# Patient Record
Sex: MALE | Race: WHITE | NOT HISPANIC OR LATINO | URBAN - METROPOLITAN AREA
[De-identification: names, ages, dates, MRNs, and addresses within clinical notes are randomized per-mention and may not be internally consistent; named-entity substitution may affect disease eponyms.]

---

## 2017-10-28 ENCOUNTER — INPATIENT (INPATIENT)
Facility: HOSPITAL | Age: 52
LOS: 2 days | Discharge: HOME | DRG: 390 | End: 2017-10-31
Attending: SURGERY | Admitting: SURGERY
Payer: COMMERCIAL

## 2017-10-28 VITALS
TEMPERATURE: 99 F | RESPIRATION RATE: 18 BRPM | HEART RATE: 97 BPM | OXYGEN SATURATION: 100 % | SYSTOLIC BLOOD PRESSURE: 138 MMHG | DIASTOLIC BLOOD PRESSURE: 92 MMHG

## 2017-10-28 LAB
ALBUMIN SERPL ELPH-MCNC: 4.6 G/DL — SIGNIFICANT CHANGE UP (ref 3.3–5)
ALP SERPL-CCNC: 53 U/L — SIGNIFICANT CHANGE UP (ref 40–120)
ALT FLD-CCNC: 40 U/L — SIGNIFICANT CHANGE UP (ref 10–45)
ANION GAP SERPL CALC-SCNC: 15 MMOL/L — SIGNIFICANT CHANGE UP (ref 5–17)
APTT BLD: 28.8 SEC — SIGNIFICANT CHANGE UP (ref 27.5–37.4)
AST SERPL-CCNC: 25 U/L — SIGNIFICANT CHANGE UP (ref 10–40)
BASOPHILS NFR BLD AUTO: 0.4 % — SIGNIFICANT CHANGE UP (ref 0–2)
BILIRUB SERPL-MCNC: 0.6 MG/DL — SIGNIFICANT CHANGE UP (ref 0.2–1.2)
BUN SERPL-MCNC: 18 MG/DL — SIGNIFICANT CHANGE UP (ref 7–23)
CALCIUM SERPL-MCNC: 8.6 MG/DL — SIGNIFICANT CHANGE UP (ref 8.4–10.5)
CHLORIDE SERPL-SCNC: 95 MMOL/L — LOW (ref 96–108)
CO2 SERPL-SCNC: 28 MMOL/L — SIGNIFICANT CHANGE UP (ref 22–31)
CREAT SERPL-MCNC: 0.94 MG/DL — SIGNIFICANT CHANGE UP (ref 0.5–1.3)
EOSINOPHIL NFR BLD AUTO: 0.6 % — SIGNIFICANT CHANGE UP (ref 0–6)
GLUCOSE SERPL-MCNC: 124 MG/DL — HIGH (ref 70–99)
HCT VFR BLD CALC: 45.9 % — SIGNIFICANT CHANGE UP (ref 39–50)
HGB BLD-MCNC: 15.4 G/DL — SIGNIFICANT CHANGE UP (ref 13–17)
INR BLD: 0.98 — SIGNIFICANT CHANGE UP (ref 0.88–1.16)
LIDOCAIN IGE QN: 28 U/L — SIGNIFICANT CHANGE UP (ref 7–60)
LYMPHOCYTES # BLD AUTO: 16.7 % — SIGNIFICANT CHANGE UP (ref 13–44)
MCHC RBC-ENTMCNC: 30.6 PG — SIGNIFICANT CHANGE UP (ref 27–34)
MCHC RBC-ENTMCNC: 33.6 G/DL — SIGNIFICANT CHANGE UP (ref 32–36)
MCV RBC AUTO: 91.3 FL — SIGNIFICANT CHANGE UP (ref 80–100)
MONOCYTES NFR BLD AUTO: 14.7 % — HIGH (ref 2–14)
NEUTROPHILS NFR BLD AUTO: 67.6 % — SIGNIFICANT CHANGE UP (ref 43–77)
PLATELET # BLD AUTO: 231 K/UL — SIGNIFICANT CHANGE UP (ref 150–400)
POTASSIUM SERPL-MCNC: 3.4 MMOL/L — LOW (ref 3.5–5.3)
POTASSIUM SERPL-SCNC: 3.4 MMOL/L — LOW (ref 3.5–5.3)
PROT SERPL-MCNC: 7.8 G/DL — SIGNIFICANT CHANGE UP (ref 6–8.3)
PROTHROM AB SERPL-ACNC: 10.9 SEC — SIGNIFICANT CHANGE UP (ref 9.8–12.7)
RBC # BLD: 5.03 M/UL — SIGNIFICANT CHANGE UP (ref 4.2–5.8)
RBC # FLD: 13.3 % — SIGNIFICANT CHANGE UP (ref 10.3–16.9)
SODIUM SERPL-SCNC: 138 MMOL/L — SIGNIFICANT CHANGE UP (ref 135–145)
WBC # BLD: 7.9 K/UL — SIGNIFICANT CHANGE UP (ref 3.8–10.5)
WBC # FLD AUTO: 7.9 K/UL — SIGNIFICANT CHANGE UP (ref 3.8–10.5)

## 2017-10-28 PROCEDURE — 99285 EMERGENCY DEPT VISIT HI MDM: CPT

## 2017-10-28 RX ORDER — FAMOTIDINE 10 MG/ML
20 INJECTION INTRAVENOUS ONCE
Qty: 0 | Refills: 0 | Status: COMPLETED | OUTPATIENT
Start: 2017-10-28 | End: 2017-10-28

## 2017-10-28 RX ORDER — SODIUM CHLORIDE 9 MG/ML
1000 INJECTION INTRAMUSCULAR; INTRAVENOUS; SUBCUTANEOUS ONCE
Qty: 0 | Refills: 0 | Status: COMPLETED | OUTPATIENT
Start: 2017-10-28 | End: 2017-10-28

## 2017-10-28 RX ORDER — SODIUM CHLORIDE 9 MG/ML
1000 INJECTION INTRAMUSCULAR; INTRAVENOUS; SUBCUTANEOUS
Qty: 0 | Refills: 0 | Status: DISCONTINUED | OUTPATIENT
Start: 2017-10-28 | End: 2017-10-29

## 2017-10-28 RX ORDER — IOHEXOL 300 MG/ML
50 INJECTION, SOLUTION INTRAVENOUS ONCE
Qty: 0 | Refills: 0 | Status: COMPLETED | OUTPATIENT
Start: 2017-10-28 | End: 2017-10-28

## 2017-10-28 RX ORDER — METOCLOPRAMIDE HCL 10 MG
10 TABLET ORAL ONCE
Qty: 0 | Refills: 0 | Status: COMPLETED | OUTPATIENT
Start: 2017-10-28 | End: 2017-10-28

## 2017-10-28 RX ORDER — SODIUM CHLORIDE 9 MG/ML
3 INJECTION INTRAMUSCULAR; INTRAVENOUS; SUBCUTANEOUS ONCE
Qty: 0 | Refills: 0 | Status: COMPLETED | OUTPATIENT
Start: 2017-10-28 | End: 2017-10-28

## 2017-10-28 RX ADMIN — IOHEXOL 50 MILLILITER(S): 300 INJECTION, SOLUTION INTRAVENOUS at 22:47

## 2017-10-28 RX ADMIN — Medication 10 MILLIGRAM(S): at 22:14

## 2017-10-28 RX ADMIN — SODIUM CHLORIDE 3 MILLILITER(S): 9 INJECTION INTRAMUSCULAR; INTRAVENOUS; SUBCUTANEOUS at 22:11

## 2017-10-28 RX ADMIN — SODIUM CHLORIDE 1000 MILLILITER(S): 9 INJECTION INTRAMUSCULAR; INTRAVENOUS; SUBCUTANEOUS at 22:14

## 2017-10-28 RX ADMIN — SODIUM CHLORIDE 1000 MILLILITER(S): 9 INJECTION INTRAMUSCULAR; INTRAVENOUS; SUBCUTANEOUS at 22:58

## 2017-10-28 RX ADMIN — FAMOTIDINE 20 MILLIGRAM(S): 10 INJECTION INTRAVENOUS at 22:14

## 2017-10-28 NOTE — ED ADULT NURSE NOTE - OBJECTIVE STATEMENT
Pt presents with C/O PERSISTENT VOMITING over 3 days after eating dinner in restaurant. Denied Diarrhea, Pn, but stated that he is unable to keep anything down, including liquids.

## 2017-10-28 NOTE — ED PROVIDER NOTE - OBJECTIVE STATEMENT
The pt is a 53 y/o M, who presents to ED c/o n/v and lower abd pain x 2 d s/p eating at a restaurant (states that others who ate same things are sick w/similar symptoms). Pt c/o feeling "very dehydrated", n/v after any po intake, lower abd cramping after po intake, passing gas, normal BM today.  Took meclizine and ppi w/o any improvement of symptoms. No PSH. Denies fevers, chills, cp, sob, dysuria, diarrhea, constipation, anorexia

## 2017-10-28 NOTE — ED PROVIDER NOTE - ATTENDING CONTRIBUTION TO CARE
51 yo male h/o gerd c/o abd pain, distension, intractable n/v since eating out w friends 3 d ago.  Friends ill w same but now better.  Pt denies prior abd surg.  Decreased bm but had small bm today, + flatus.  No blood in emesis.  Pain feels like gas.  No fever, no urinary sx.  No relief w compazine suppository 6 h pta.  Well appearing, nad, nc/at, mm slightly dry, lung cta, heart reg, abd soft, mild ttp llq, + distension, no r/g, no cvat, ext no c/c/e, no gross neuro deficits.  ? viral or food borne, no sbo risks so doubt sbo, ? diverticulitis.  Plan labs, ivf, antiemetics, ct abd, reassess.

## 2017-10-28 NOTE — ED PROVIDER NOTE - GASTROINTESTINAL, MLM
Abdomen soft, + LLQ tend, no rebound, no guarding, no CVA tend b/l Abdomen softly distended, + LLQ > RLQ tend, no rebound, no guarding, no CVA tend b/l

## 2017-10-28 NOTE — ED ADULT NURSE NOTE - CHPI ED SYMPTOMS NEG
no abdominal distension/no burning urination/no hematuria/no diarrhea/no blood in stool/no dysuria/no fever

## 2017-10-28 NOTE — ED PROVIDER NOTE - PROGRESS NOTE DETAILS
surg consulted Pt w sbo on ct; surg consulted.  Pt signed out to Dr Torres; BRAIN Nye will cont to follow.

## 2017-10-28 NOTE — ED ADULT TRIAGE NOTE - CHIEF COMPLAINT QUOTE
pt went out to a restaurant 3 days ago , since then pt has not been bale to hold down anything by mouth , pt c/o diffuse abdominal pain , no diarrhea

## 2017-10-28 NOTE — ED PROVIDER NOTE - MEDICAL DECISION MAKING DETAILS
pt w/n/v and lower abd pain s/p eating out at restaurant - states that others sick w/same symptoms but his pain is not improved, no diarrhea or constipation  and passing gas, pt tender on exam hence scanned -- found to have sbo, surg consulted and will admit to tele

## 2017-10-29 LAB
APPEARANCE UR: (no result)
APTT BLD: 29.5 SEC — SIGNIFICANT CHANGE UP (ref 27.5–37.4)
BILIRUB UR-MCNC: NEGATIVE — SIGNIFICANT CHANGE UP
BLD GP AB SCN SERPL QL: NEGATIVE — SIGNIFICANT CHANGE UP
COLOR SPEC: YELLOW — SIGNIFICANT CHANGE UP
DIFF PNL FLD: NEGATIVE — SIGNIFICANT CHANGE UP
GLUCOSE UR QL: NEGATIVE — SIGNIFICANT CHANGE UP
INR BLD: 1 — SIGNIFICANT CHANGE UP (ref 0.88–1.16)
KETONES UR-MCNC: NEGATIVE — SIGNIFICANT CHANGE UP
LACTATE SERPL-SCNC: 0.9 MMOL/L — SIGNIFICANT CHANGE UP (ref 0.5–2)
LEUKOCYTE ESTERASE UR-ACNC: NEGATIVE — SIGNIFICANT CHANGE UP
NITRITE UR-MCNC: NEGATIVE — SIGNIFICANT CHANGE UP
PH UR: 8 — SIGNIFICANT CHANGE UP (ref 5–8)
PROT UR-MCNC: NEGATIVE MG/DL — SIGNIFICANT CHANGE UP
PROTHROM AB SERPL-ACNC: 11.1 SEC — SIGNIFICANT CHANGE UP (ref 9.8–12.7)
RH IG SCN BLD-IMP: POSITIVE — SIGNIFICANT CHANGE UP
SP GR SPEC: <=1.005 — SIGNIFICANT CHANGE UP (ref 1–1.03)
UROBILINOGEN FLD QL: 0.2 E.U./DL — SIGNIFICANT CHANGE UP

## 2017-10-29 PROCEDURE — 74000: CPT | Mod: 26,76

## 2017-10-29 PROCEDURE — 99222 1ST HOSP IP/OBS MODERATE 55: CPT | Mod: GC

## 2017-10-29 PROCEDURE — 71010: CPT | Mod: 26

## 2017-10-29 PROCEDURE — 74177 CT ABD & PELVIS W/CONTRAST: CPT | Mod: 26

## 2017-10-29 RX ORDER — SODIUM CHLORIDE 9 MG/ML
1000 INJECTION, SOLUTION INTRAVENOUS
Qty: 0 | Refills: 0 | Status: DISCONTINUED | OUTPATIENT
Start: 2017-10-29 | End: 2017-10-31

## 2017-10-29 RX ORDER — PANTOPRAZOLE SODIUM 20 MG/1
40 TABLET, DELAYED RELEASE ORAL DAILY
Qty: 0 | Refills: 0 | Status: DISCONTINUED | OUTPATIENT
Start: 2017-10-29 | End: 2017-10-31

## 2017-10-29 RX ORDER — HEPARIN SODIUM 5000 [USP'U]/ML
5000 INJECTION INTRAVENOUS; SUBCUTANEOUS EVERY 8 HOURS
Qty: 0 | Refills: 0 | Status: DISCONTINUED | OUTPATIENT
Start: 2017-10-29 | End: 2017-10-31

## 2017-10-29 RX ORDER — HYDROMORPHONE HYDROCHLORIDE 2 MG/ML
0.5 INJECTION INTRAMUSCULAR; INTRAVENOUS; SUBCUTANEOUS EVERY 4 HOURS
Qty: 0 | Refills: 0 | Status: DISCONTINUED | OUTPATIENT
Start: 2017-10-29 | End: 2017-10-30

## 2017-10-29 RX ORDER — ONDANSETRON 8 MG/1
4 TABLET, FILM COATED ORAL EVERY 6 HOURS
Qty: 0 | Refills: 0 | Status: DISCONTINUED | OUTPATIENT
Start: 2017-10-29 | End: 2017-10-31

## 2017-10-29 RX ORDER — OMEPRAZOLE 10 MG/1
1 CAPSULE, DELAYED RELEASE ORAL
Qty: 0 | Refills: 0 | COMMUNITY

## 2017-10-29 RX ADMIN — SODIUM CHLORIDE 125 MILLILITER(S): 9 INJECTION, SOLUTION INTRAVENOUS at 14:31

## 2017-10-29 RX ADMIN — SODIUM CHLORIDE 200 MILLILITER(S): 9 INJECTION INTRAMUSCULAR; INTRAVENOUS; SUBCUTANEOUS at 02:42

## 2017-10-29 RX ADMIN — PANTOPRAZOLE SODIUM 40 MILLIGRAM(S): 20 TABLET, DELAYED RELEASE ORAL at 05:47

## 2017-10-29 RX ADMIN — HEPARIN SODIUM 5000 UNIT(S): 5000 INJECTION INTRAVENOUS; SUBCUTANEOUS at 05:47

## 2017-10-29 RX ADMIN — SODIUM CHLORIDE 125 MILLILITER(S): 9 INJECTION, SOLUTION INTRAVENOUS at 05:20

## 2017-10-29 RX ADMIN — HEPARIN SODIUM 5000 UNIT(S): 5000 INJECTION INTRAVENOUS; SUBCUTANEOUS at 13:12

## 2017-10-29 RX ADMIN — HEPARIN SODIUM 5000 UNIT(S): 5000 INJECTION INTRAVENOUS; SUBCUTANEOUS at 22:08

## 2017-10-29 NOTE — H&P ADULT - HISTORY OF PRESENT ILLNESS
51 yo M hx GERD on prilosec presents with 2 days of N/V. Patient noted N/V associated with abdominal distension and crampiness but not pain. He has never had abdominal surgery. He has never had a colonoscopy. No FHx or PMHx of CRC or IBD. 53 yo M hx GERD on prilosec presents with 2 days of N/V. Patient noted N/V associated with abdominal distension and crampiness but not pain. Still passing gas and having some diarrhea. He has never had abdominal surgery. He has never had a colonoscopy. No FHx or PMHx of CRC or IBD.

## 2017-10-29 NOTE — H&P ADULT - NSHPPHYSICALEXAM_GEN_ALL_CORE
Alert and conversant in NAD  Breathing nonlabored  Abdomen distended, nontender, nondistended, no rebound/guarding  Extremities, warm, well perfused

## 2017-10-29 NOTE — H&P ADULT - ASSESSMENT
53 yo M with SBO. NGT placed in ED with minimal return. Patient made NPO.   -NPO/IVF  -Pain/nausea control  -NGT LIWS  -SCD/SQH  -OOBA/IS    Seen and examined with chief on call. 51 yo M with SBO. NGT placed in ED with minimal return. Patient made NPO.   -NPO/IVF  -Pain/nausea control  -NGT LIWS  -SCD/SQH  -OOBA/IS  -Serial abdominal exams    Seen and examined with chief on call.

## 2017-10-29 NOTE — H&P ADULT - ATTENDING COMMENTS
Patient seen and examined at bedside.  Feeling better, although complaining of irritation from NGT in throat/nose.  +flatus.  Denied abdominal pain.  Abdomen soft, mildly distended/tympanitic.  NT.  Labs, CT reviewed.  AXR with contrast in colon, although persistent dilated loops of small bowel.  Impression: 52M with SBO versus gastroenteritis.  Plan: NGT removed at bedside as minimal output  -NPO for 4 hours, then may start clear liquids if no nausea  -D/C telemetry  -DVT prophylaxis

## 2017-10-29 NOTE — H&P ADULT - NSHPLABSRESULTS_GEN_ALL_CORE
15.4   7.9   )-----------( 231      ( 28 Oct 2017 22:21 )             45.9   28 Oct 2017 22:21    138    |  95     |  18     ----------------------------<  124    3.4     |  28     |  0.94     Ca    8.6        28 Oct 2017 22:21    TPro  7.8    /  Alb  4.6    /  TBili  0.6    /  DBili  x      /  AST  25     /  ALT  40     /  AlkPhos  53     28 Oct 2017 22:21  PT/INR - ( 28 Oct 2017 22:21 )   PT: 10.9 sec;   INR: 0.98          PTT - ( 28 Oct 2017 22:21 )  PTT:28.8 sec    < from: CT Abdomen and Pelvis w/ Oral Cont and w/ IV Cont (10.29.17 @ 00:26) >      EXAM:  CT ABDOMEN AND PELVIS OC IC                            INTERPRETATION:  CT of the ABDOMEN and PELVIS with intravenous contrast   dated 10/29/2017 12:26 AM    FINDINGS:    Lower chest: Linear atelectasis bilateral lower lobes.    Liver: Smooth in contour. Multiple small hepatic cysts measuring up to   2.4 cm in the left lobe. Several additional subcentimeter hypodensities   in the liver which are too small to characterize. Relatively hypodense   lesion in the region of the caudate lobe measuring 2.9 cm and associated   with peripheral nodular enhancement, compatible with a hemangioma. Portal   and hepatic veins are patent.    Biliary system: Gallbladder is normal in size. No calcified gallstones.   No biliary ductal dilatation.      Adrenal glands: Unremarkable.    Kidneys: Symmetric parenchymal enhancement. 1.5 cm cyst midportion right   kidney. Subcentimeter low-density lesion midportion left kidney, too   small to characterize. Extrarenal pelvis on the left. No hydronephrosis.   No renal or ureteral stone.   Urinary Bladder: Unremarkable.    Reproductive organs: Mildly enlarged prostate..     Bowel/Peritoneum: Dilated loops of small bowel are noted, measuring up to   4.5 cm. Possible transition point noted in the mid abdomen right of   midline.. Colonic diverticuli without evidence of diverticulitis. No   extraluminal gas. Small hiatal hernia. Small amount of ascites seen in   the left lower quadrant. Mild mesenteric edema. Normal appendix.    Lymph nodes: No lymphadenopathy.      Abdominal wall: Small fat-containing umbilical hernia. Small   fat-containing left inguinal hernia.      IMPRESSION:     Small bowel obstruction, with possible transition point in the mid   abdomen right of midline. Mild mesenteric edema and ascites.    Multiple hepatic cysts and probable hemangioma, as above.      Findings discussed with Dr. Nye  on 10/29/2017 2:05 AM with read   back verification.              "Thank you for the opportunity to participate in the care of this   patient."    COCA FRANCISCO J CASALDUC M.D., RADIOLOGY RESIDENT  This document has been electronically signed.  DA ROSARIO M.D., ATTENDING RADIOLOGIST  This documenthas been electronically signed. Oct 29 2017  2:25AM                  < end of copied text >

## 2017-10-30 LAB
ANION GAP SERPL CALC-SCNC: 14 MMOL/L — SIGNIFICANT CHANGE UP (ref 5–17)
BUN SERPL-MCNC: 7 MG/DL — SIGNIFICANT CHANGE UP (ref 7–23)
CALCIUM SERPL-MCNC: 8.5 MG/DL — SIGNIFICANT CHANGE UP (ref 8.4–10.5)
CHLORIDE SERPL-SCNC: 100 MMOL/L — SIGNIFICANT CHANGE UP (ref 96–108)
CO2 SERPL-SCNC: 25 MMOL/L — SIGNIFICANT CHANGE UP (ref 22–31)
CREAT SERPL-MCNC: 0.79 MG/DL — SIGNIFICANT CHANGE UP (ref 0.5–1.3)
GLUCOSE SERPL-MCNC: 83 MG/DL — SIGNIFICANT CHANGE UP (ref 70–99)
HCT VFR BLD CALC: 41.1 % — SIGNIFICANT CHANGE UP (ref 39–50)
HGB BLD-MCNC: 13.7 G/DL — SIGNIFICANT CHANGE UP (ref 13–17)
MAGNESIUM SERPL-MCNC: 2.5 MG/DL — SIGNIFICANT CHANGE UP (ref 1.6–2.6)
MCHC RBC-ENTMCNC: 30.9 PG — SIGNIFICANT CHANGE UP (ref 27–34)
MCHC RBC-ENTMCNC: 33.3 G/DL — SIGNIFICANT CHANGE UP (ref 32–36)
MCV RBC AUTO: 92.8 FL — SIGNIFICANT CHANGE UP (ref 80–100)
PHOSPHATE SERPL-MCNC: 2.1 MG/DL — LOW (ref 2.5–4.5)
PLATELET # BLD AUTO: 211 K/UL — SIGNIFICANT CHANGE UP (ref 150–400)
POTASSIUM SERPL-MCNC: 3.7 MMOL/L — SIGNIFICANT CHANGE UP (ref 3.5–5.3)
POTASSIUM SERPL-SCNC: 3.7 MMOL/L — SIGNIFICANT CHANGE UP (ref 3.5–5.3)
RBC # BLD: 4.43 M/UL — SIGNIFICANT CHANGE UP (ref 4.2–5.8)
RBC # FLD: 13.1 % — SIGNIFICANT CHANGE UP (ref 10.3–16.9)
SODIUM SERPL-SCNC: 139 MMOL/L — SIGNIFICANT CHANGE UP (ref 135–145)
WBC # BLD: 6.7 K/UL — SIGNIFICANT CHANGE UP (ref 3.8–10.5)
WBC # FLD AUTO: 6.7 K/UL — SIGNIFICANT CHANGE UP (ref 3.8–10.5)

## 2017-10-30 PROCEDURE — 99232 SBSQ HOSP IP/OBS MODERATE 35: CPT | Mod: GC

## 2017-10-30 RX ORDER — INFLUENZA VIRUS VACCINE 15; 15; 15; 15 UG/.5ML; UG/.5ML; UG/.5ML; UG/.5ML
0.5 SUSPENSION INTRAMUSCULAR ONCE
Qty: 0 | Refills: 0 | Status: COMPLETED | OUTPATIENT
Start: 2017-10-30 | End: 2017-10-31

## 2017-10-30 RX ORDER — POTASSIUM PHOSPHATE, MONOBASIC POTASSIUM PHOSPHATE, DIBASIC 236; 224 MG/ML; MG/ML
15 INJECTION, SOLUTION INTRAVENOUS ONCE
Qty: 0 | Refills: 0 | Status: COMPLETED | OUTPATIENT
Start: 2017-10-30 | End: 2017-10-30

## 2017-10-30 RX ADMIN — PANTOPRAZOLE SODIUM 40 MILLIGRAM(S): 20 TABLET, DELAYED RELEASE ORAL at 11:59

## 2017-10-30 RX ADMIN — SODIUM CHLORIDE 125 MILLILITER(S): 9 INJECTION, SOLUTION INTRAVENOUS at 06:46

## 2017-10-30 RX ADMIN — HEPARIN SODIUM 5000 UNIT(S): 5000 INJECTION INTRAVENOUS; SUBCUTANEOUS at 06:46

## 2017-10-30 NOTE — PROGRESS NOTE ADULT - ATTENDING COMMENTS
Patient seen and examined at bedside.  +Flatus/BM.  Feeling well.  Abdomen soft minimally distended NT.  Clear liquid diet.  Likely discharge home tomorrow if continued improvement.

## 2017-10-30 NOTE — PROGRESS NOTE ADULT - ASSESSMENT
53 yo M with SBO. NGT placed in ED with minimal return, now removed and diet advanced to CLD     -IVF  -CLD  -Pain/nausea control  -SCD/SQH  -OOBA/IS

## 2017-10-30 NOTE — PROGRESS NOTE ADULT - SUBJECTIVE AND OBJECTIVE BOX
ON: Still in for regional. + flatus, - BM abd exam unchanged.  10/29: AM upright AXR - showing passage of contrast. NGT dc'd. Complaining of some burping, will adv to CLD, once he feels he is able to tolerate. ABd soft, non-tender        53 yo M with SBO. NGT placed in ED with minimal return, now removed and diet advanced to CLD     -IVF  -CLD  -Pain/nausea control  -SCD/SQH  -OOBA/IS ON: Still in for regional. + flatus, - BM abd exam unchanged.  10/29: AM upright AXR - showing passage of contrast. NGT dc'd. Complaining of some burping, will adv to CLD, once he feels he is able to tolerate. ABd soft, non-tender    SUBJECTIVE: Patient seen and examined bedside by chief resident. Complaining less of pain, does not feel as distended.     heparin  Injectable 5000 Unit(s) SubCutaneous every 8 hours      Vital Signs Last 24 Hrs  T(C): 35.9 (30 Oct 2017 05:19), Max: 36.9 (29 Oct 2017 22:32)  T(F): 96.6 (30 Oct 2017 05:19), Max: 98.5 (29 Oct 2017 22:32)  HR: 60 (30 Oct 2017 04:10) (60 - 77)  BP: 149/76 (30 Oct 2017 04:10) (135/70 - 159/84)  BP(mean): 106 (30 Oct 2017 04:10) (105 - 115)  RR: 16 (30 Oct 2017 04:10) (16 - 18)  SpO2: 97% (30 Oct 2017 04:10) (96% - 98%)  I&O's Detail    29 Oct 2017 07:01  -  30 Oct 2017 07:00  --------------------------------------------------------  IN:    lactated ringers.: 1375 mL  Total IN: 1375 mL    OUT:    Nasoenteral Tube: 50 mL    Voided: 2675 mL  Total OUT: 2725 mL    Total NET: -1350 mL          General: NAD, resting comfortably in bed  C/V: NSR  Pulm: Nonlabored breathing, no respiratory distress  Abd: soft, Mildly distended, non-tender to deep palpation.  Extrem: WWP, no edema, SCDs in place        LABS:                        13.7   6.7   )-----------( 211      ( 30 Oct 2017 07:56 )             41.1     10-28    138  |  95<L>  |  18  ----------------------------<  124<H>  3.4<L>   |  28  |  0.94    Ca    8.6      28 Oct 2017 22:21    TPro  7.8  /  Alb  4.6  /  TBili  0.6  /  DBili  x   /  AST  25  /  ALT  40  /  AlkPhos  53  10-28    PT/INR - ( 29 Oct 2017 02:44 )   PT: 11.1 sec;   INR: 1.00          PTT - ( 29 Oct 2017 02:44 )  PTT:29.5 sec  Urinalysis Basic - ( 29 Oct 2017 00:17 )    Color: Yellow / Appearance: SL Cloudy / SG: <=1.005 / pH: x  Gluc: x / Ketone: NEGATIVE  / Bili: Negative / Urobili: 0.2 E.U./dL   Blood: x / Protein: NEGATIVE mg/dL / Nitrite: NEGATIVE   Leuk Esterase: NEGATIVE / RBC: x / WBC x   Sq Epi: x / Non Sq Epi: x / Bacteria: x        RADIOLOGY & ADDITIONAL STUDIES:

## 2017-10-31 VITALS
SYSTOLIC BLOOD PRESSURE: 125 MMHG | HEART RATE: 69 BPM | DIASTOLIC BLOOD PRESSURE: 74 MMHG | TEMPERATURE: 98 F | RESPIRATION RATE: 16 BRPM | OXYGEN SATURATION: 97 %

## 2017-10-31 PROCEDURE — 80048 BASIC METABOLIC PNL TOTAL CA: CPT

## 2017-10-31 PROCEDURE — 90686 IIV4 VACC NO PRSV 0.5 ML IM: CPT

## 2017-10-31 PROCEDURE — 80053 COMPREHEN METABOLIC PANEL: CPT

## 2017-10-31 PROCEDURE — 85027 COMPLETE CBC AUTOMATED: CPT

## 2017-10-31 PROCEDURE — 96375 TX/PRO/DX INJ NEW DRUG ADDON: CPT

## 2017-10-31 PROCEDURE — 86850 RBC ANTIBODY SCREEN: CPT

## 2017-10-31 PROCEDURE — 83605 ASSAY OF LACTIC ACID: CPT

## 2017-10-31 PROCEDURE — 86901 BLOOD TYPING SEROLOGIC RH(D): CPT

## 2017-10-31 PROCEDURE — 85025 COMPLETE CBC W/AUTO DIFF WBC: CPT

## 2017-10-31 PROCEDURE — 74018 RADEX ABDOMEN 1 VIEW: CPT

## 2017-10-31 PROCEDURE — 85610 PROTHROMBIN TIME: CPT

## 2017-10-31 PROCEDURE — 83735 ASSAY OF MAGNESIUM: CPT

## 2017-10-31 PROCEDURE — 74020: CPT

## 2017-10-31 PROCEDURE — 84100 ASSAY OF PHOSPHORUS: CPT

## 2017-10-31 PROCEDURE — 74177 CT ABD & PELVIS W/CONTRAST: CPT

## 2017-10-31 PROCEDURE — 96374 THER/PROPH/DIAG INJ IV PUSH: CPT | Mod: XU

## 2017-10-31 PROCEDURE — 85730 THROMBOPLASTIN TIME PARTIAL: CPT

## 2017-10-31 PROCEDURE — 86900 BLOOD TYPING SEROLOGIC ABO: CPT

## 2017-10-31 PROCEDURE — 99285 EMERGENCY DEPT VISIT HI MDM: CPT | Mod: 25

## 2017-10-31 PROCEDURE — 71045 X-RAY EXAM CHEST 1 VIEW: CPT

## 2017-10-31 PROCEDURE — 83690 ASSAY OF LIPASE: CPT

## 2017-10-31 PROCEDURE — 36415 COLL VENOUS BLD VENIPUNCTURE: CPT

## 2017-10-31 RX ORDER — PANTOPRAZOLE SODIUM 20 MG/1
40 TABLET, DELAYED RELEASE ORAL
Qty: 0 | Refills: 0 | Status: DISCONTINUED | OUTPATIENT
Start: 2017-10-31 | End: 2017-10-31

## 2017-10-31 RX ORDER — SODIUM CHLORIDE 9 MG/ML
3 INJECTION INTRAMUSCULAR; INTRAVENOUS; SUBCUTANEOUS EVERY 8 HOURS
Qty: 0 | Refills: 0 | Status: DISCONTINUED | OUTPATIENT
Start: 2017-10-31 | End: 2017-10-31

## 2017-10-31 RX ADMIN — INFLUENZA VIRUS VACCINE 0.5 MILLILITER(S): 15; 15; 15; 15 SUSPENSION INTRAMUSCULAR at 10:25

## 2017-10-31 RX ADMIN — SODIUM CHLORIDE 3 MILLILITER(S): 9 INJECTION INTRAMUSCULAR; INTRAVENOUS; SUBCUTANEOUS at 05:34

## 2017-10-31 NOTE — DISCHARGE NOTE ADULT - HOSPITAL COURSE
51 yo M presented with 2 days of N/V, abdominal distension and crampiness admitted with SBO. NGT placed in ED with minimal return. X-ray with contrast showed passage of contrast which prompted dc of NGT. Patient was advanced to CLD and he tolerated it well. He was advanced to low res diet and begin having flatus and BM. He abdominal exam improved with decrease of distension. He will be discharged with instructions to follow up with Dr. Lamar and also with instructions for him to have a colonoscopy performed outpatient.

## 2017-10-31 NOTE — DISCHARGE NOTE ADULT - PATIENT PORTAL LINK FT
“You can access the [facility] Patient Portal, offered by [facility], by registering with the following website: [email or web address]”

## 2017-10-31 NOTE — DISCHARGE NOTE ADULT - CARE PROVIDER_API CALL
Deann Lamar), Surgery  [address], [place] [address]  Phone: [phone]  Fax: [phone]

## 2017-10-31 NOTE — DISCHARGE NOTE ADULT - PLAN OF CARE
Resolve small bowel obstruction and associated symptoms -You may eat a regular diet, as tolerated. If you are unable to tolerate a regular diet, you may begin with softer foods initially and then progress to a regular diet.  -It is recommended that you have a colonoscopy performed outpatient after discharge from the hospital.  -Please follow up with Dr. Lamar in 1-2 weeks. Call her office to schedule an appointment: [phone].

## 2017-10-31 NOTE — DISCHARGE NOTE ADULT - CARE PLAN
Principal Discharge DX:	SBO (small bowel obstruction)  Goal:	Resolve small bowel obstruction and associated symptoms  Instructions for follow-up, activity and diet:	-You may eat a regular diet, as tolerated. If you are unable to tolerate a regular diet, you may begin with softer foods initially and then progress to a regular diet.  -It is recommended that you have a colonoscopy performed outpatient after discharge from the hospital.  -Please follow up with Dr. Lamar in 1-2 weeks. Call her office to schedule an appointment: [phone].

## 2017-10-31 NOTE — PROGRESS NOTE ADULT - SUBJECTIVE AND OBJECTIVE BOX
ON: Tolerated diet, + Flatus, + BM, abdominal exam markedly improved.   10/30: Low res diet. refused K-phos.         53 yo M with SBO. NGT placed in ED with minimal return, now removed and diet advanced to CLD       -Low res diet  -Pain/nausea control  -SCD/SQH  -OOBA/IS

## 2017-10-31 NOTE — DISCHARGE NOTE ADULT - MEDICATION SUMMARY - MEDICATIONS TO TAKE
I will START or STAY ON the medications listed below when I get home from the hospital:    PriLOSEC OTC 20 mg oral delayed release tablet  -- 1 tab(s) by mouth once a day  -- Indication: For GERD (gastroesophageal reflux disease)

## 2017-11-10 DIAGNOSIS — K21.9 GASTRO-ESOPHAGEAL REFLUX DISEASE WITHOUT ESOPHAGITIS: ICD-10-CM

## 2017-11-10 DIAGNOSIS — K56.609 UNSPECIFIED INTESTINAL OBSTRUCTION, UNSPECIFIED AS TO PARTIAL VERSUS COMPLETE OBSTRUCTION: ICD-10-CM

## 2018-01-21 NOTE — ED ADULT NURSE NOTE - HEIGHT IN CM
ED General Adult HPI





- General


Chief complaint: Urogenital-Male


Stated complaint: POSS UTI/ABSCESS ON GENITAL


Time Seen by Provider: 01/21/18 20:08


Source: patient, RN notes reviewed


Mode of arrival: Ambulatory


Limitations: No Limitations





- History of Present Illness


Initial comments: 





This is a 44-year-old male who was previously on known to this provider, patient

's does not have a local primary care doctor, and endorses a past history of IV 

methamphetamine abuse within the past month.  Patient reports that a few days 

ago he had a sore on the dorsal aspect of his penis, and "picked it off."  

Shortly thereafter developed diffuse penile pain and swelling, left-sided 

testicular pain, right lower quadrant pain and right lower back pain.  He 

endorses fevers, chills and body aches.  He thinks that he has the flu.  He 

denies severe headache, neck pain, chest pain, shortness of breath, irritative 

urinary symptoms.  His pain is constant, does not radiate anywhere, it 

increases with palpation and decreases with rest.


-: Gradual


Location: back, abdomen, genitals


Severity scale (0 -10): 5


Quality: aching


Consistency: constant


Improves with: rest


Worsens with: movement


Associated Symptoms: diaphoresis, fever/chills, loss of appetite, malaise, 

weakness.  denies: confusion, chest pain, nausea/vomiting, rash, seizure, 

shortness of breath, syncope





- Related Data


 Previous Rx's











 Medication  Instructions  Recorded  Last Taken  Type


 


Ciprofloxacin HCl [Cipro] 500 mg PO Q12H #14 tab 12/18/13 Unknown Rx


 


HYDROcodone/APAP 5-325 [[place] 1 each PO Q6HR PRN #14 tablet 08/20/17 Unknown Rx





5-325 mg TAB]    


 


Sulfamethoxazole/Trimethoprim 1 each PO BID #14 tablet 08/20/17 Unknown Rx





[Bactrim DS TAB]    











 Allergies











Allergy/AdvReac Type Severity Reaction Status Date / Time


 


Penicillins Allergy  Hives Verified 12/17/13 22:28














ED Review of Systems


ROS: 


Stated complaint: POSS UTI/ABSCESS ON GENITAL


Other details as noted in HPI








ED Past Medical Hx





- Past Medical History


Previous Medical History?: No





- Surgical History


Past Surgical History?: Yes


Additional Surgical History: Jaw reconstruction





- Social History


Smoking Status: Current Every Day Smoker


Substance Use Type: Alcohol, Marijuana





- Medications


Home Medications: 


 Home Medications











 Medication  Instructions  Recorded  Confirmed  Last Taken  Type


 


Ciprofloxacin HCl [Cipro] 500 mg PO Q12H #14 tab 12/18/13  Unknown Rx


 


HYDROcodone/APAP 5-325 [[place] 1 each PO Q6HR PRN #14 tablet 08/20/17  Unknown Rx





5-325 mg TAB]     


 


Sulfamethoxazole/Trimethoprim 1 each PO BID #14 tablet 08/20/17  Unknown Rx





[Bactrim DS TAB]     














ED Physical Exam





- General


Limitations: No Limitations


General appearance: alert, in no apparent distress





- Head


Head exam: Present: atraumatic, normocephalic





- Eye


Eye exam: Present: normal appearance, EOMI





- ENT


ENT exam: Present: mucous membranes dry





- Neck


Neck exam: Present: normal inspection, full ROM





- Respiratory


Respiratory exam: Present: normal lung sounds bilaterally.  Absent: respiratory 

distress





- Cardiovascular


Cardiovascular Exam: Present: normal rhythm, tachycardia, normal heart sounds.  

Absent: systolic murmur, diastolic murmur, rubs, gallop





- GI/Abdominal


GI/Abdominal exam: Present: soft, normal bowel sounds.  Absent: distended, 

tenderness, guarding, rebound, rigid





- Rectal


Rectal exam: Present: deferred





- 


 exam: Present: testicular tenderness (there is left-sided testicular 

tenderness.), other (there is no right-sided testicular tenderness.  There is 

normal cremasteric reflex bilaterally.  There is normal testicular lie 

bilaterally.)


External exam: Present: other (the penis shaft is diffusely swollen, tender and 

indurated.  There is a scab noted at 2:00.)





- Extremities Exam


Extremities exam: Present: normal inspection, full ROM, normal capillary 

refill.  Absent: pedal edema, joint swelling, calf tenderness





- Back Exam


Back exam: Present: normal inspection, full ROM.  Absent: tenderness, CVA 

tenderness (R), paraspinal tenderness, vertebral tenderness





- Neurological Exam


Neurological exam: Present: alert, oriented X3, CN II-XII intact, other (

Extraocular movements intact.  Tongue midline.  No facial droop.  Facial 

sensation intact to light touch in the V1, V2, V3 distribution bilaterally.  5 

and 5 strength in 4 extremities..  Sensation is intact to light touch in 4 

extremities.).  Absent: motor sensory deficit





- Psychiatric


Psychiatric exam: Present: normal mood, anxious





- Skin


Skin exam: Present: warm, dry, intact, normal color





ED Course


 Vital Signs











  01/21/18 01/21/18





  18:07 18:15


 


Temperature 101.7 F H 


 


Pulse Rate 138 H 


 


Respiratory 14 18





Rate  


 


Blood Pressure 132/81 


 


O2 Sat by Pulse 99 





Oximetry  














- Reevaluation(s)


Reevaluation #1: 





01/21/18 21:28


Differential diagnosis, including but not limited to: Intra-abdominal abscess, 

penile cellulitis, Mika's gangrene, epididymal orchitis








Assessment and plan: 44-year-old male with probable penile cellulitis with fever

, leukocytosis and tachycardia.  Abdominal pain is most likely referred pain.  

Testicular examination demonstrates left-sided testicular tenderness.  X-ray of 

the chest is negative, influenza screen is negative, based on history and 

physical, I favor sepsis secondary to soft tissue infection.  His perineum is 

nontender and I think a necrotizing or invasive infection is less likely.  Code 

sepsis called overhead, patient reports anaphylaxis to penicillins, he is 

therefore medicated empirically with Levaquin, as well as vancomycin.  IV fluids

, lactic acid, blood cultures ordered.  Case discussed with urology consult at 

[place], Dr. Ledezma.








He indicates that based on the current data there is no indication to transfer 

the patient as he does not require surgical intervention, and recommends IV 

antibiotics.





Reevaluation #2: 





01/21/18 22:07


CT scan of the abdomen and pelvis is negative.  Testicular ultrasound was 

negative.  Patient still requires pain medication.  Awaiting repeat vital 

signs.  Case presents to Hospital physician, Dr. Nelson; she accepted the 

patient to the medical service.





ED Medical Decision Making





- Lab Data


Result diagrams: 


 01/21/18 18:16





 01/21/18 18:16








 Vital Signs











  01/21/18 01/21/18





  18:07 18:15


 


Temperature 101.7 F H 


 


Pulse Rate 138 H 


 


Respiratory 14 18





Rate  


 


Blood Pressure 132/81 


 


O2 Sat by Pulse 99 





Oximetry  














 Lab Results











  01/21/18 01/21/18 01/21/18 Range/Units





  18:16 18:16 18:18 


 


WBC  15.1 H    (4.5-11.0)  K/mm3


 


RBC  4.61    (3.65-5.03)  M/mm3


 


Hgb  13.8    (11.8-15.2)  gm/dl


 


Hct  42.0    (35.5-45.6)  %


 


MCV  91    (84-94)  fl


 


MCH  30    (28-32)  pg


 


MCHC  33    (32-34)  %


 


RDW  14.0    (13.2-15.2)  %


 


Plt Count  270    (140-440)  K/mm3


 


Lymph % (Auto)  9.6 L    (13.4-35.0)  %


 


Mono % (Auto)  8.6 H    (0.0-7.3)  %


 


Eos % (Auto)  0.6    (0.0-4.3)  %


 


Baso % (Auto)  0.4    (0.0-1.8)  %


 


Lymph #  1.4    (1.2-5.4)  K/mm3


 


Mono #  1.3 H    (0.0-0.8)  K/mm3


 


Eos #  0.1    (0.0-0.4)  K/mm3


 


Baso #  0.1    (0.0-0.1)  K/mm3


 


Seg Neutrophils %  80.8 H    (40.0-70.0)  %


 


Seg Neutrophils #  12.2 H    (1.8-7.7)  K/mm3


 


Sodium   134 L   (137-145)  mmol/L


 


Potassium   4.3   (3.6-5.0)  mmol/L


 


Chloride   95.5 L   ()  mmol/L


 


Carbon Dioxide   24   (22-30)  mmol/L


 


Anion Gap   19   mmol/L


 


BUN   12   (9-20)  mg/dL


 


Creatinine   0.9   (0.8-1.5)  mg/dL


 


Estimated GFR   > 60   ml/min


 


BUN/Creatinine Ratio   13   %


 


Glucose   100   ()  mg/dL


 


Lactic Acid     (0.7-2.0)  mmol/L


 


Calcium   9.5   (8.4-10.2)  mg/dL


 


Urine Color    Yellow  (Yellow)  


 


Urine Turbidity    Clear  (Clear)  


 


Urine pH    7.0  (5.0-7.0)  


 


Ur Specific Gravity    1.024  (1.003-1.030)  


 


Urine Protein    30 mg/dl  (Negative)  mg/dL


 


Urine Glucose (UA)    Neg  (Negative)  mg/dL


 


Urine Ketones    20  (Negative)  mg/dL


 


Urine Blood    Neg  (Negative)  


 


Urine Nitrite    Neg  (Negative)  


 


Urine Bilirubin    Neg  (Negative)  


 


Urine Urobilinogen    4.0  (<2.0)  mg/dL


 


Ur Leukocyte Esterase    Neg  (Negative)  


 


Urine WBC (Auto)    5.0  (0.0-6.0)  /HPF


 


Urine RBC (Auto)    20.0  (0.0-6.0)  /HPF


 


U Epithel Cells (Auto)    1.0  (0-13.0)  /HPF


 


Urine Mucus    Few  /HPF














  01/21/18 Range/Units





  20:30 


 


WBC   (4.5-11.0)  K/mm3


 


RBC   (3.65-5.03)  M/mm3


 


Hgb   (11.8-15.2)  gm/dl


 


Hct   (35.5-45.6)  %


 


MCV   (84-94)  fl


 


MCH   (28-32)  pg


 


MCHC   (32-34)  %


 


RDW   (13.2-15.2)  %


 


Plt Count   (140-440)  K/mm3


 


Lymph % (Auto)   (13.4-35.0)  %


 


Mono % (Auto)   (0.0-7.3)  %


 


Eos % (Auto)   (0.0-4.3)  %


 


Baso % (Auto)   (0.0-1.8)  %


 


Lymph #   (1.2-5.4)  K/mm3


 


Mono #   (0.0-0.8)  K/mm3


 


Eos #   (0.0-0.4)  K/mm3


 


Baso #   (0.0-0.1)  K/mm3


 


Seg Neutrophils %   (40.0-70.0)  %


 


Seg Neutrophils #   (1.8-7.7)  K/mm3


 


Sodium   (137-145)  mmol/L


 


Potassium   (3.6-5.0)  mmol/L


 


Chloride   ()  mmol/L


 


Carbon Dioxide   (22-30)  mmol/L


 


Anion Gap   mmol/L


 


BUN   (9-20)  mg/dL


 


Creatinine   (0.8-1.5)  mg/dL


 


Estimated GFR   ml/min


 


BUN/Creatinine Ratio   %


 


Glucose   ()  mg/dL


 


Lactic Acid  0.90  (0.7-2.0)  mmol/L


 


Calcium   (8.4-10.2)  mg/dL


 


Urine Color   (Yellow)  


 


Urine Turbidity   (Clear)  


 


Urine pH   (5.0-7.0)  


 


Ur Specific Gravity   (1.003-1.030)  


 


Urine Protein   (Negative)  mg/dL


 


Urine Glucose (UA)   (Negative)  mg/dL


 


Urine Ketones   (Negative)  mg/dL


 


Urine Blood   (Negative)  


 


Urine Nitrite   (Negative)  


 


Urine Bilirubin   (Negative)  


 


Urine Urobilinogen   (<2.0)  mg/dL


 


Ur Leukocyte Esterase   (Negative)  


 


Urine WBC (Auto)   (0.0-6.0)  /HPF


 


Urine RBC (Auto)   (0.0-6.0)  /HPF


 


U Epithel Cells (Auto)   (0-13.0)  /HPF


 


Urine Mucus   /HPF














- Radiology Data


Radiology results: report reviewed, image reviewed





X-ray of the chest, interpreted by myself and radiology: No acute disease.  CT 

scan of the  abdomen pelvis is pending.





Testicular ultrasound is pending.


Critical care attestation.: 


If time is entered above; I have spent that time in minutes in the direct care 

of this critically ill patient, excluding procedure time.








ED Disposition


Clinical Impression: 


 Sepsis affecting skin





Disposition: DC-09 OP ADMIT IP TO THIS HOSP


Is pt being admited?: Yes


Does the pt Need Aspirin: No


Condition: Good


Referrals: 


NATASHA CARABALLO MD [Primary Care Provider] - 3-5 Days [phone]

## 2022-10-06 NOTE — PATIENT PROFILE ADULT. - TOBACCO USE
Nodular and infiltrative basal cell carcinoma.  We should probably do Mohs surgery with plastic surgery closure.  I probably should talk to the patient sometime within the next few weeks.  We can always put this off until November.  Thank you   Never smoker

## 2023-06-22 NOTE — ED ADULT TRIAGE NOTE - MODE OF ARRIVAL
Showering allowed/Stairs allowed/Walking - Indoors allowed/No heavy lifting/straining/Walking - Outdoors allowed/Follow Instructions Provided by your Surgical Team Walk in